# Patient Record
Sex: FEMALE | Race: OTHER | Employment: FULL TIME | URBAN - METROPOLITAN AREA
[De-identification: names, ages, dates, MRNs, and addresses within clinical notes are randomized per-mention and may not be internally consistent; named-entity substitution may affect disease eponyms.]

---

## 2022-05-16 ENCOUNTER — HOSPITAL ENCOUNTER (EMERGENCY)
Age: 42
Discharge: HOME | End: 2022-05-16
Attending: EMERGENCY MEDICINE
Payer: COMMERCIAL

## 2022-05-16 VITALS
RESPIRATION RATE: 18 BRPM | OXYGEN SATURATION: 100 % | DIASTOLIC BLOOD PRESSURE: 74 MMHG | HEIGHT: 57 IN | BODY MASS INDEX: 34.95 KG/M2 | SYSTOLIC BLOOD PRESSURE: 109 MMHG | HEART RATE: 66 BPM | WEIGHT: 162 LBS

## 2022-05-16 DIAGNOSIS — R07.89 ATYPICAL CHEST PAIN: Primary | ICD-10-CM

## 2022-05-16 LAB
ABSOLUTE EOS #: 0.1 K/UL (ref 0–0.4)
ABSOLUTE LYMPH #: 3.1 K/UL (ref 1–4.8)
ABSOLUTE MONO #: 0.5 K/UL (ref 0.1–1.2)
ALBUMIN SERPL-MCNC: 4.7 G/DL (ref 3.5–5.2)
ALBUMIN/GLOBULIN RATIO: 1.4 (ref 1–2.5)
ALP BLD-CCNC: 50 U/L (ref 35–104)
ALT SERPL-CCNC: 13 U/L (ref 5–33)
ANION GAP SERPL CALCULATED.3IONS-SCNC: 12 MMOL/L (ref 9–17)
AST SERPL-CCNC: 14 U/L
BASOPHILS # BLD: 1 % (ref 0–2)
BASOPHILS ABSOLUTE: 0.1 K/UL (ref 0–0.2)
BILIRUB SERPL-MCNC: 0.2 MG/DL (ref 0.3–1.2)
BILIRUBIN DIRECT: <0.08 MG/DL
BILIRUBIN, INDIRECT: ABNORMAL MG/DL (ref 0–1)
BUN BLDV-MCNC: 11 MG/DL (ref 6–20)
CALCIUM SERPL-MCNC: 10.5 MG/DL (ref 8.6–10.4)
CHLORIDE BLD-SCNC: 104 MMOL/L (ref 98–107)
CO2: 24 MMOL/L (ref 20–31)
CREAT SERPL-MCNC: 0.56 MG/DL (ref 0.5–0.9)
EKG ATRIAL RATE: 59 BPM
EKG P AXIS: 47 DEGREES
EKG P-R INTERVAL: 134 MS
EKG Q-T INTERVAL: 446 MS
EKG QRS DURATION: 94 MS
EKG QTC CALCULATION (BAZETT): 441 MS
EKG R AXIS: 26 DEGREES
EKG T AXIS: 85 DEGREES
EKG VENTRICULAR RATE: 59 BPM
EOSINOPHILS RELATIVE PERCENT: 2 % (ref 1–4)
GFR AFRICAN AMERICAN: >60 ML/MIN
GFR NON-AFRICAN AMERICAN: >60 ML/MIN
GFR SERPL CREATININE-BSD FRML MDRD: ABNORMAL ML/MIN/{1.73_M2}
GLUCOSE BLD-MCNC: 96 MG/DL (ref 70–99)
HCT VFR BLD CALC: 39.1 % (ref 36–46)
HEMOGLOBIN: 13.3 G/DL (ref 12–16)
LIPASE: 32 U/L (ref 13–60)
LYMPHOCYTES # BLD: 36 % (ref 24–44)
MCH RBC QN AUTO: 29.4 PG (ref 26–34)
MCHC RBC AUTO-ENTMCNC: 33.9 G/DL (ref 31–37)
MCV RBC AUTO: 86.6 FL (ref 80–100)
MONOCYTES # BLD: 5 % (ref 2–11)
PDW BLD-RTO: 13 % (ref 12.5–15.4)
PLATELET # BLD: 329 K/UL (ref 140–450)
PMV BLD AUTO: 8.2 FL (ref 6–12)
POTASSIUM SERPL-SCNC: 3.6 MMOL/L (ref 3.7–5.3)
RBC # BLD: 4.52 M/UL (ref 4–5.2)
SEG NEUTROPHILS: 56 % (ref 36–66)
SEGMENTED NEUTROPHILS ABSOLUTE COUNT: 4.9 K/UL (ref 1.8–7.7)
SODIUM BLD-SCNC: 140 MMOL/L (ref 135–144)
TOTAL PROTEIN: 8 G/DL (ref 6.4–8.3)
TROPONIN, HIGH SENSITIVITY: <6 NG/L (ref 0–14)
WBC # BLD: 8.6 K/UL (ref 3.5–11)

## 2022-05-16 PROCEDURE — 80048 BASIC METABOLIC PNL TOTAL CA: CPT

## 2022-05-16 PROCEDURE — 80076 HEPATIC FUNCTION PANEL: CPT

## 2022-05-16 PROCEDURE — 99284 EMERGENCY DEPT VISIT MOD MDM: CPT

## 2022-05-16 PROCEDURE — 83690 ASSAY OF LIPASE: CPT

## 2022-05-16 PROCEDURE — 85025 COMPLETE CBC W/AUTO DIFF WBC: CPT

## 2022-05-16 PROCEDURE — 84484 ASSAY OF TROPONIN QUANT: CPT

## 2022-05-16 PROCEDURE — 93005 ELECTROCARDIOGRAM TRACING: CPT | Performed by: EMERGENCY MEDICINE

## 2022-05-16 PROCEDURE — 36415 COLL VENOUS BLD VENIPUNCTURE: CPT

## 2022-05-16 RX ORDER — PANTOPRAZOLE SODIUM 40 MG/1
40 TABLET, DELAYED RELEASE ORAL DAILY
Qty: 30 TABLET | Refills: 0 | Status: SHIPPED | OUTPATIENT
Start: 2022-05-16 | End: 2022-06-15

## 2022-05-16 RX ORDER — BUPROPION HYDROCHLORIDE 300 MG/1
TABLET ORAL
COMMUNITY
Start: 2022-05-13

## 2022-05-16 ASSESSMENT — HEART SCORE: ECG: 0

## 2022-05-16 ASSESSMENT — PAIN DESCRIPTION - PAIN TYPE: TYPE: ACUTE PAIN

## 2022-05-16 ASSESSMENT — PAIN DESCRIPTION - LOCATION: LOCATION: CHEST

## 2022-05-16 ASSESSMENT — PAIN - FUNCTIONAL ASSESSMENT: PAIN_FUNCTIONAL_ASSESSMENT: 0-10

## 2022-05-16 ASSESSMENT — PAIN SCALES - GENERAL: PAINLEVEL_OUTOF10: 7

## 2022-05-16 NOTE — ED PROVIDER NOTES
[address] [facility] ED  EMERGENCY DEPARTMENT ENCOUNTER      Pt Name: Tea Monique  MRN: [id number]  [place] 1980  Date of evaluation: 5/16/2022  Provider: Anai Peres MD    CHIEF COMPLAINT     Chief Complaint   Patient presents with    Chest Pain         HISTORY OF PRESENT ILLNESS   (Location/Symptom, Timing/Onset, Context/Setting,Quality, Duration, Modifying Factors, Severity)  Note limiting factors. Tea Monique is a 38 y.o. female who presents to the emergency department with a history ofConstant pressure and pain over the anterior chest for the last month worse in the last few days she does not report associated diaphoresis shortness of breath. Patient has had heartburn in the past but is currently on no medication for it. She has a history of hyperlipidemia. The history is provided by the patient and medical records. Nursing Notes werereviewed. REVIEW OF SYSTEMS    (2-9 systems for level 4, 10 or more for level 5)     Review of Systems   All other systems reviewed and are negative. Except as noted above the remainder of the review of systems was reviewed and negative. PAST MEDICAL HISTORY   History reviewed. No pertinent past medical history. SURGICALHISTORY       Past Surgical History:   Procedure Laterality Date    CORNEAL TRANSPLANT           CURRENT MEDICATIONS       Previous Medications    BUPROPION (WELLBUTRIN XL) 300 MG EXTENDED RELEASE TABLET    take 1 tablet by mouth once daily       ALLERGIES     Patient has no known allergies. FAMILY HISTORY     History reviewed. No pertinent family history.        SOCIAL HISTORY       Social History     Socioeconomic History    Marital status:      Spouse name: None    Number of children: None    Years of education: None    Highest education level: None   Occupational History    None   Tobacco Use    Smoking status: Never Smoker    Smokeless tobacco: Never Used   Vaping Use    Vaping Use: Never used Substance and Sexual Activity    Alcohol use: Yes     Comment: social    Drug use: Never    Sexual activity: None   Other Topics Concern    None   Social History Narrative    None     Social Determinants of Health     Financial Resource Strain:     Difficulty of Paying Living Expenses: Not on file   Food Insecurity:     Worried About Running Out of Food in the Last Year: Not on file    Dilshad of Food in the Last Year: Not on file   Transportation Needs:     Lack of Transportation (Medical): Not on file    Lack of Transportation (Non-Medical):  Not on file   Physical Activity:     Days of Exercise per Week: Not on file    Minutes of Exercise per Session: Not on file   Stress:     Feeling of Stress : Not on file   Social Connections:     Frequency of Communication with Friends and Family: Not on file    Frequency of Social Gatherings with Friends and Family: Not on file    Attends Confucianist Services: Not on file    Active Member of [address] [place] [address] or Organizations: Not on file    Attends Club or Organization Meetings: Not on file    Marital Status: Not on file   Intimate Partner Violence:     Fear of Current or Ex-Partner: Not on file    Emotionally Abused: Not on file    Physically Abused: Not on file    Sexually Abused: Not on file   Housing Stability:     Unable to Pay for Housing in the Last Year: Not on file    Number of [place] in the Last Year: Not on file    Unstable Housing in the Last Year: Not on file       SCREENINGS      Heart Score for chest pain patients  History: Slightly Suspicious  ECG: Normal  Patient Age: < 45 years  *Risk factors for Atherosclerotic disease: Hypercholesterolemia  Risk Factors: 1 or 2 risk factors  Troponin: < 1X normal limit  Heart Score Total: 1      PHYSICAL EXAM    (up to 7 for level 4, 8 or more for level 5)     ED Triage Vitals [05/16/22 1306]   BP Temp Temp src Pulse Resp SpO2 Height Weight   109/74 -- -- 66 18 100 % 4' 9\" (1.448 m) 162 lb (73.5 kg) Physical Exam  Vitals reviewed. Constitutional:       General: She is not in acute distress. Appearance: She is not ill-appearing. HENT:      Head: Normocephalic. Right Ear: External ear normal.      Left Ear: External ear normal.      Nose: Nose normal.   Eyes:      Extraocular Movements: Extraocular movements intact. Pupils: Pupils are equal, round, and reactive to light. Cardiovascular:      Rate and Rhythm: Normal rate and regular rhythm. Heart sounds: Normal heart sounds. Pulmonary:      Effort: Pulmonary effort is normal. No respiratory distress. Breath sounds: Normal breath sounds. No rhonchi or rales. Abdominal:      General: Bowel sounds are decreased. There is no distension. Palpations: Abdomen is soft. There is no hepatomegaly or splenomegaly. Tenderness: There is abdominal tenderness in the epigastric area. There is no guarding or rebound. Negative signs include Bhatia's sign and McBurney's sign. Musculoskeletal:         General: No tenderness. Cervical back: Neck supple. Right lower leg: No edema. Left lower leg: No edema. Skin:     General: Skin is warm and dry. Coloration: Skin is not pale. Neurological:      General: No focal deficit present. Mental Status: She is alert and oriented to person, place, and time. Psychiatric:         Mood and Affect: Mood normal.         DIAGNOSTIC RESULTS     EKG: All EKG's are interpreted by the Emergency Department Physician who either signs orCo-signs this chart in the absence of a cardiologist.    Sinus bradycardia with a rate of 59 beats a minute. Normal axis. No ischemic change.     RADIOLOGY:     Interpretation per the Radiologist below, ifavailable at the time of this note:    No orders to display         ED BEDSIDE ULTRASOUND:   Performed by ED Physician - none    LABS:  Labs Reviewed   BASIC METABOLIC PANEL W/ REFLEX TO MG FOR LOW K - Abnormal; Notable for the following components:       Result Value    Calcium 10.5 (*)     Potassium 3.6 (*)     All other components within normal limits   HEPATIC FUNCTION PANEL - Abnormal; Notable for the following components: Total Bilirubin 0.20 (*)     All other components within normal limits   CBC WITH AUTO DIFFERENTIAL   TROPONIN   LIPASE       All other labs were within normal range ornot returned as of this dictation. EMERGENCY DEPARTMENT COURSE and DIFFERENTIAL DIAGNOSIS/MDM:   Vitals:    Vitals:    05/16/22 1306   BP: 109/74   Pulse: 66   Resp: 18   SpO2: 100%   Weight: 73.5 kg (162 lb)   Height: 4' 9\" (1.448 m)            Patient's current cardiac work-up is negative. Her symptoms are suggestive of gastroesophageal reflux for which she is placed on Protonix. She is also referred to outpatient cardiology follow-up for further management. Her heart score is 1    MDM    CONSULTS:  None    PROCEDURES:  Unlessotherwise noted below, none     Procedures    FINAL IMPRESSION      1. Atypical chest pain          DISPOSITION/PLAN   DISPOSITION Decision To Discharge 05/16/2022 02:18:55 PM      PATIENT REFERRED TO:  Sarahi Ortez MD  [address] [facility] [address] [facility], [address] [facility] [address]  [phone]            DISCHARGE MEDICATIONS:         Problem List:  There is no problem list on file for this patient. Summation      Patient Course: Discharged    ED Medicationsadministered this visit:  Medications - No data to display    New Prescriptions from this visit:    New Prescriptions    PANTOPRAZOLE (PROTONIX) 40 MG TABLET    Take 1 tablet by mouth daily       Follow-up:  Sarahi Ortez MD  [address] [facility] [address] [facility] [address]  [phone]          JANETH DESIR GS, 6 [facility]  [address]  [phone]              Final Impression:   1.  Atypical chest pain               (Please note that portions of this note were completed with a voice recognitionprogram.  Efforts were made to edit the dictations but occasionally words are mis-transcribed.)    German Bajwa MD (electronically signed)  Attending Emergency Physician            German Bajwa MD  05/16/22 4004

## 2023-07-25 ENCOUNTER — OFFICE VISIT (OUTPATIENT)
Dept: URBAN - METROPOLITAN AREA CLINIC 35 | Facility: CLINIC | Age: 43
End: 2023-07-25

## 2023-07-25 NOTE — HPI-CONSTIPATION
43 year old female patient presents today for change in bowel habits. Patient admits (recent onset/longstanding history) of constipation. Onset was --. Patient currently admits -- bowel movements per --, with/without strain. Stools are --. Admits/Denies any blood, mucus, or melena in stools. She admits/denies any pruritus ani or rectal pain. Patient admits/denies associated abdominal pains, bloating, and gas. When patient has a bowel movement she does/does not feel like she is completely emptying his/her bowels.

## 2023-09-13 ENCOUNTER — DASHBOARD ENCOUNTERS (OUTPATIENT)
Age: 43
End: 2023-09-13

## 2023-09-13 ENCOUNTER — LAB OUTSIDE AN ENCOUNTER (OUTPATIENT)
Dept: URBAN - METROPOLITAN AREA CLINIC 111 | Facility: CLINIC | Age: 43
End: 2023-09-13

## 2023-09-13 ENCOUNTER — OFFICE VISIT (OUTPATIENT)
Dept: URBAN - METROPOLITAN AREA CLINIC 111 | Facility: CLINIC | Age: 43
End: 2023-09-13
Payer: COMMERCIAL

## 2023-09-13 VITALS
SYSTOLIC BLOOD PRESSURE: 110 MMHG | HEIGHT: 61 IN | HEART RATE: 81 BPM | WEIGHT: 167 LBS | BODY MASS INDEX: 31.53 KG/M2 | TEMPERATURE: 97.9 F | DIASTOLIC BLOOD PRESSURE: 74 MMHG

## 2023-09-13 DIAGNOSIS — R19.7 DIARRHEA: ICD-10-CM

## 2023-09-13 DIAGNOSIS — R12 HEARTBURN: ICD-10-CM

## 2023-09-13 DIAGNOSIS — K21.9 GERD: ICD-10-CM

## 2023-09-13 DIAGNOSIS — R19.4 CHANGE IN BOWEL HABIT: ICD-10-CM

## 2023-09-13 PROCEDURE — 99204 OFFICE O/P NEW MOD 45 MIN: CPT | Performed by: INTERNAL MEDICINE

## 2023-09-13 NOTE — HPI-TODAY'S VISIT:
43 years old female presents with a chief complaint of indigestion and change in bowel habit with epigastric pain and heartburn  for the past few months. She reports that her symptoms began with intolerance to coffee and cream, leading to frequent bowel movements. The patient experiences the urge to go to the bathroom five or six times a day, but not every day, and this has been occurring on and off for the past six to seven months. She  feels incomplete after bowel movements, as if not everything has been evacuated.  The patient also reports experiencing heartburn, indigestion, and a recent onset of burning sensation in her stomach after eating. She has felt nauseated once or twice. She admits to being a poor eater and consuming a lot of junk food. The patient has a family history of colon cancer in her grandmother and two great-aunts.  The patient mentions having a bit of bleeding sometimes , which she attributes to pushing and likely hemorrhoids. She has had two children. She underwent a colonoscopy in 2019, which showed no abnormalities.

## 2023-10-02 ENCOUNTER — TELEPHONE ENCOUNTER (OUTPATIENT)
Dept: URBAN - METROPOLITAN AREA CLINIC 111 | Facility: CLINIC | Age: 43
End: 2023-10-02

## 2023-10-11 ENCOUNTER — TELEPHONE ENCOUNTER (OUTPATIENT)
Dept: URBAN - METROPOLITAN AREA CLINIC 23 | Facility: CLINIC | Age: 43
End: 2023-10-11

## 2023-10-11 ENCOUNTER — OFFICE VISIT (OUTPATIENT)
Dept: URBAN - METROPOLITAN AREA LAB 3 | Facility: LAB | Age: 43
End: 2023-10-11
Payer: COMMERCIAL

## 2023-10-11 DIAGNOSIS — K63.5 BENIGN COLON POLYP: ICD-10-CM

## 2023-10-11 DIAGNOSIS — Z80.0 BROTHER AT YOUNG AGE FAMILY HISTORY OF COLON CANCER: ICD-10-CM

## 2023-10-11 DIAGNOSIS — R19.4 ALTERATION IN BOWEL ELIMINATION: ICD-10-CM

## 2023-10-11 PROCEDURE — 45385 COLONOSCOPY W/LESION REMOVAL: CPT | Performed by: INTERNAL MEDICINE

## 2023-10-11 RX ORDER — PANTOPRAZOLE SODIUM 40 MG/1
1 TABLET TABLET, DELAYED RELEASE ORAL ONCE A DAY
Qty: 30 | OUTPATIENT
Start: 2023-10-11

## 2023-10-17 ENCOUNTER — TELEPHONE ENCOUNTER (OUTPATIENT)
Dept: URBAN - METROPOLITAN AREA CLINIC 23 | Facility: CLINIC | Age: 43
End: 2023-10-17

## 2023-10-19 ENCOUNTER — TELEPHONE ENCOUNTER (OUTPATIENT)
Dept: URBAN - METROPOLITAN AREA CLINIC 111 | Facility: CLINIC | Age: 43
End: 2023-10-19